# Patient Record
Sex: FEMALE | Race: OTHER | Employment: FULL TIME | URBAN - METROPOLITAN AREA
[De-identification: names, ages, dates, MRNs, and addresses within clinical notes are randomized per-mention and may not be internally consistent; named-entity substitution may affect disease eponyms.]

---

## 2017-09-14 ENCOUNTER — APPOINTMENT (OUTPATIENT)
Dept: CT IMAGING | Facility: HOSPITAL | Age: 57
End: 2017-09-14
Payer: COMMERCIAL

## 2017-09-14 ENCOUNTER — HOSPITAL ENCOUNTER (OUTPATIENT)
Age: 57
Discharge: ANOTHER HOSPITAL | End: 2017-09-14
Payer: COMMERCIAL

## 2017-09-14 ENCOUNTER — HOSPITAL ENCOUNTER (OUTPATIENT)
Facility: HOSPITAL | Age: 57
Setting detail: OBSERVATION
Discharge: HOME | End: 2017-09-15
Admitting: INTERNAL MEDICINE
Payer: COMMERCIAL

## 2017-09-14 VITALS
SYSTOLIC BLOOD PRESSURE: 140 MMHG | TEMPERATURE: 98 F | HEART RATE: 77 BPM | WEIGHT: 130 LBS | OXYGEN SATURATION: 97 % | RESPIRATION RATE: 18 BRPM | DIASTOLIC BLOOD PRESSURE: 81 MMHG | BODY MASS INDEX: 28.05 KG/M2 | HEIGHT: 57 IN

## 2017-09-14 DIAGNOSIS — R51.9 NONINTRACTABLE HEADACHE, UNSPECIFIED CHRONICITY PATTERN, UNSPECIFIED HEADACHE TYPE: ICD-10-CM

## 2017-09-14 DIAGNOSIS — R20.0 FACIAL NUMBNESS: Primary | ICD-10-CM

## 2017-09-14 DIAGNOSIS — R20.0 RIGHT FACIAL NUMBNESS: Primary | ICD-10-CM

## 2017-09-14 LAB
ANION GAP SERPL CALC-SCNC: 7 MMOL/L (ref 0–18)
BASOPHILS # BLD: 0 K/UL (ref 0–0.2)
BASOPHILS NFR BLD: 1 %
BUN SERPL-MCNC: 12 MG/DL (ref 8–20)
BUN/CREAT SERPL: 12.6 (ref 10–20)
CALCIUM SERPL-MCNC: 9.3 MG/DL (ref 8.5–10.5)
CHLORIDE SERPL-SCNC: 105 MMOL/L (ref 95–110)
CO2 SERPL-SCNC: 28 MMOL/L (ref 22–32)
CREAT SERPL-MCNC: 0.95 MG/DL (ref 0.5–1.5)
EOSINOPHIL # BLD: 0 K/UL (ref 0–0.7)
EOSINOPHIL NFR BLD: 1 %
ERYTHROCYTE [DISTWIDTH] IN BLOOD BY AUTOMATED COUNT: 12.5 % (ref 11–15)
GLUCOSE BLDC GLUCOMTR-MCNC: 117 MG/DL (ref 70–99)
GLUCOSE SERPL-MCNC: 121 MG/DL (ref 70–99)
HCT VFR BLD AUTO: 36.9 % (ref 35–48)
HGB BLD-MCNC: 12.3 G/DL (ref 12–16)
LYMPHOCYTES # BLD: 1.1 K/UL (ref 1–4)
LYMPHOCYTES NFR BLD: 27 %
MCH RBC QN AUTO: 28.3 PG (ref 27–32)
MCHC RBC AUTO-ENTMCNC: 33.4 G/DL (ref 32–37)
MCV RBC AUTO: 84.7 FL (ref 80–100)
MONOCYTES # BLD: 0.3 K/UL (ref 0–1)
MONOCYTES NFR BLD: 8 %
NEUTROPHILS # BLD AUTO: 2.7 K/UL (ref 1.8–7.7)
NEUTROPHILS NFR BLD: 64 %
OSMOLALITY UR CALC.SUM OF ELEC: 291 MOSM/KG (ref 275–295)
PLATELET # BLD AUTO: 269 K/UL (ref 140–400)
PMV BLD AUTO: 8.1 FL (ref 7.4–10.3)
POTASSIUM SERPL-SCNC: 3.8 MMOL/L (ref 3.3–5.1)
RBC # BLD AUTO: 4.36 M/UL (ref 3.7–5.4)
SODIUM SERPL-SCNC: 140 MMOL/L (ref 136–144)
TROPONIN I SERPL-MCNC: 0 NG/ML (ref ?–0.03)
WBC # BLD AUTO: 4.2 K/UL (ref 4–11)

## 2017-09-14 PROCEDURE — 99202 OFFICE O/P NEW SF 15 MIN: CPT

## 2017-09-14 PROCEDURE — 70450 CT HEAD/BRAIN W/O DYE: CPT

## 2017-09-14 PROCEDURE — 99213 OFFICE O/P EST LOW 20 MIN: CPT

## 2017-09-14 RX ORDER — POTASSIUM CHLORIDE 20 MEQ/1
40 TABLET, EXTENDED RELEASE ORAL ONCE
Status: COMPLETED | OUTPATIENT
Start: 2017-09-14 | End: 2017-09-14

## 2017-09-14 RX ORDER — LEVOTHYROXINE SODIUM 0.05 MG/1
50 TABLET ORAL NIGHTLY
Status: DISCONTINUED | OUTPATIENT
Start: 2017-09-15 | End: 2017-09-14

## 2017-09-14 RX ORDER — MORPHINE SULFATE 2 MG/ML
1 INJECTION, SOLUTION INTRAMUSCULAR; INTRAVENOUS EVERY 4 HOURS PRN
Status: DISCONTINUED | OUTPATIENT
Start: 2017-09-14 | End: 2017-09-15

## 2017-09-14 RX ORDER — ACETAMINOPHEN 500 MG
1000 TABLET ORAL ONCE
Status: COMPLETED | OUTPATIENT
Start: 2017-09-14 | End: 2017-09-14

## 2017-09-14 RX ORDER — LEVOTHYROXINE SODIUM 0.05 MG/1
50 TABLET ORAL NIGHTLY
Status: DISCONTINUED | OUTPATIENT
Start: 2017-09-14 | End: 2017-09-15

## 2017-09-14 RX ORDER — ASPIRIN 81 MG/1
324 TABLET, CHEWABLE ORAL ONCE
Status: COMPLETED | OUTPATIENT
Start: 2017-09-14 | End: 2017-09-14

## 2017-09-14 RX ORDER — ACETAMINOPHEN 325 MG/1
650 TABLET ORAL EVERY 8 HOURS PRN
Status: DISCONTINUED | OUTPATIENT
Start: 2017-09-14 | End: 2017-09-15

## 2017-09-14 NOTE — ED PROVIDER NOTES
Patient presents with:  Numbness Weakness (neurologic)      HPI:     Wes Tarango is a 56year old female presents with a chief complaint of a headache and numbness and tingling to the right side of the face that started last night at 8 PM.  The patie above.  Constitutional and Vital Signs Reviewed.     Physical Exam:     /81   Pulse 77   Temp 98.3 °F (36.8 °C) (Oral)   Resp 18   Ht 144.8 cm (4' 9\")   Wt 59 kg   SpO2 97%   BMI 28.13 kg/m²   GENERAL: well developed, well nourished, well hydrated, n

## 2017-09-14 NOTE — ED PROVIDER NOTES
Patient Seen in: [facility] AND [facility] Emergency Department    History   Patient presents with:  Numbness Weakness (neurologic)    Stated Complaint: numbness/tingling to face.      HPI    57-year-old female presents for evaluation of right-sided facial numbne well-developed and well-nourished. No distress. HENT:   Head: Normocephalic and atraumatic. Mouth/Throat: Oropharynx is clear and moist.   Eyes: Conjunctivae and EOM are normal. Pupils are equal, round, and reactive to light.    Neck: Normal range of mo malignancy, Bell's palsy, trigeminal neuralgia, TIA, stroke    Well-appearing patient, does have numbness to the right face on exam.  ER workup unremarkable. Discussed with and admitted to Dr. Allison Coleman for further evaluation and neuro consultation.   Discus

## 2017-09-14 NOTE — ED NOTES
Care assumed from triage Presents to hospital on referral from [facility]. Alert and interactive with several hour hx R temporal HA and R facial/ R lateral tongue tingling. NIH score 0.

## 2017-09-14 NOTE — ED INITIAL ASSESSMENT (HPI)
Sent from [facility] for CT scan. Pt reports some right sided headache, numbness and tingling to right side of face and tongue since 8 pm last night. No facial droop or slurred speech. No weakness or numbness to extremities.

## 2017-09-14 NOTE — ED NOTES
PATIENT DECLINING EMS TRANSPORTATION TO ER FOR FURTHER EVALUATION. REPORT CALLED TO [address] [place] [address] ED RN.

## 2017-09-14 NOTE — ED INITIAL ASSESSMENT (HPI)
REPORTS RIGHT SIDED FACE NUMBNESS/TINGLING YESTERDAY ALSO C/O TONGUE TINGLING. STATES SHE HAS A BASELINE TINGLING IN HER HANDS FROM HISTORY OF CARPAL TUNNEL.   PATIENT ALSO REPORTS RIGHT EYE PAIN AND FEELS AT IT IS \"DROOPY\"  HAND GRASPS EQUAL IN STRENGTH

## 2017-09-15 VITALS
HEIGHT: 57 IN | BODY MASS INDEX: 29.52 KG/M2 | OXYGEN SATURATION: 98 % | DIASTOLIC BLOOD PRESSURE: 64 MMHG | HEART RATE: 56 BPM | WEIGHT: 136.81 LBS | TEMPERATURE: 99 F | RESPIRATION RATE: 16 BRPM | SYSTOLIC BLOOD PRESSURE: 124 MMHG

## 2017-09-15 LAB
FOLATE SERPL-MCNC: 19.2 NG/ML
POTASSIUM SERPL-SCNC: 4.5 MMOL/L (ref 3.3–5.1)
TSH SERPL-ACNC: 2.57 UIU/ML (ref 0.45–5.33)
VIT B12 SERPL-MCNC: >1500 PG/ML (ref 181–914)

## 2017-09-15 PROCEDURE — 99243 OFF/OP CNSLTJ NEW/EST LOW 30: CPT | Performed by: OTHER

## 2017-09-15 NOTE — DISCHARGE SUMMARY
[facility] AND [facility]  Discharge Summary    Emely Alberts Patient Status:  Inpatient    1960 MRN [id number]   Location [facility] 3W/SW Attending Perlita Zhao MD   Hosp Day # 1 PCP Gabriel Monte MD     Date of Admission: 2017    Date fredi Nunez  9/15/2017  1:54 PM

## 2017-09-15 NOTE — CONSULTS
Neurology Inpatient Consult Note    Martha King : 1960   Referring Physician: Dr. Bladimir Luo  HPI:     Martha King is a 56year old female who is being seen in neurologic evaluation.     Patient presented to the emergency room last frequency  MUSCULOSKELETAL: Carpal tunnel syndrome  PSYCH: no depression, no anxiety  NEURO: see HPI    EXAM:     Vitals:  /64 (BP Location: Left arm)   Pulse 56   Temp 98.5 °F (36.9 °C)   Resp 16   Ht 57\"   Wt 136 lb 12.8 oz   SpO2 98%   BMI 29.60

## 2017-09-15 NOTE — PAYOR COMM NOTE
--------------  ADMISSION REVIEW     Payor: BHARGAVI Nicholas Franks #:  [id number]  Authorization Number: N/A    Admit date: 9/14/17  Admit time: 2026       Admitting Physician: Andrea Hatfield MD  Attending Physician:  Andrea Hatfield MD  Primary Care Physi Alcohol use: No              Allergies/Medications: Allergies: No Known Allergies    Prescriptions Prior to Admission:  Levothyroxine Sodium 50 MCG Oral Tab Take 1 tablet (50 mcg total) by mouth once daily.    Cyclobenzaprine HCl 5 MG Oral Tab Take 1 tabl Pulmonary/Chest:[OR.1] Effort normal[OR.2] and[OR.1] breath sounds normal[OR.2]. Abdominal:[OR.1] Soft[OR.2]. [OR.1] Bowel sounds are normal[OR.2]. Musculoskeletal:[OR.1] Normal range of motion[OR.2].    Neurological: She is[OR.1] alert[OR.2] and[OR.1] o MEDICATIONS ADMINISTERED IN LAST 1 DAY:  acetaminophen (TYLENOL) tab 650 mg     Date Action Dose Route User    9/15/2017 0935 Given 650 mg Oral Lan Murphy, RN      acetaminophen (TYLENOL EXTRA STRENGTH) tab 1,000 mg     Date Action D Past Surgical History:  No date:            Family History   Problem Relation Age of Onset   • Diabetes Father     • Heart Disorder Father     • Psychiatric Father     • Other [OTHER] Father     • Heart Disorder Mother     • Diabetes Mother     • Sensory: intact to light touch and pinprick throughout  Reflexes: DTRs 2+ in bilateral biceps, brachioradialis, patella  Coordination / gait: no finger-nose-finger dysmetria, gait testing deferred     IMAGING / STUDIES:  reviewed   Noncontrast head CT  CON

## 2017-09-15 NOTE — H&P
[facility]    History and Physical    Lenny Quigley Patient Status:  Inpatient    1960 MRN [id number]   Location [facility] 3W/SW Attending Sade Arellano MD   Hosp Day # 1 PCP Faheem Leung MD     Date:  9/15/2017  Date Genitourinary: Negative for dysuria. Musculoskeletal: Negative for back pain. Neurological: Positive for numbness and headaches. Negative for dizziness, facial asymmetry, speech difficulty and weakness.    Psychiatric/Behavioral: Negative for confusio Interpretation  -------------------------- Sinus Rhythm -Poor R-wave progression -nonspecific -consider old anterior infarct.  - Nonspecific T-abnormality.  ABNORMAL No previous ECGs available Electronically signed on 09/14/2017 at 17:19 by Cahrles Ramos MD

## 2017-09-16 NOTE — PLAN OF CARE
Patient/Family Goals    • Patient/Family Long Term Goal Adequate for Discharge    • Patient/Family Short Term Goal Adequate for Discharge        REVIEWED DISCHARGE INSTRUCTIONS, MEDICATION LIST AND FOLLOW UP CARE  STROKE EDUCATION PROVIDED

## 2017-09-29 ENCOUNTER — TELEPHONE (OUTPATIENT)
Dept: NEUROLOGY | Facility: CLINIC | Age: 57
End: 2017-09-29

## 2018-03-23 ENCOUNTER — HOSPITAL ENCOUNTER (OUTPATIENT)
Dept: CT IMAGING | Facility: HOSPITAL | Age: 58
Discharge: HOME | End: 2018-03-23
Attending: INTERNAL MEDICINE
Payer: COMMERCIAL

## 2018-03-23 DIAGNOSIS — R10.10 PAIN OF UPPER ABDOMEN: ICD-10-CM

## 2018-03-23 LAB — CREAT BLD-MCNC: 0.7 MG/DL (ref 0.5–1.5)

## 2018-03-23 PROCEDURE — 74177 CT ABD & PELVIS W/CONTRAST: CPT | Performed by: INTERNAL MEDICINE

## 2018-03-23 PROCEDURE — 74160 CT ABDOMEN W/CONTRAST: CPT | Performed by: INTERNAL MEDICINE

## 2018-03-23 PROCEDURE — 82565 ASSAY OF CREATININE: CPT

## 2018-05-10 PROBLEM — R14.0 ABDOMINAL BLOATING: Status: ACTIVE | Noted: 2018-05-10

## 2018-06-01 PROCEDURE — 88305 TISSUE EXAM BY PATHOLOGIST: CPT | Performed by: INTERNAL MEDICINE

## 2018-10-22 ENCOUNTER — OFFICE VISIT (OUTPATIENT)
Dept: PHYSICAL THERAPY | Age: 58
End: 2018-10-22
Attending: UROLOGY
Payer: COMMERCIAL

## 2018-10-22 DIAGNOSIS — N39.3 FEMALE STRESS INCONTINENCE: ICD-10-CM

## 2018-10-22 DIAGNOSIS — R32 URINARY INCONTINENCE: ICD-10-CM

## 2018-10-22 PROCEDURE — 97162 PT EVAL MOD COMPLEX 30 MIN: CPT

## 2018-10-22 PROCEDURE — 97112 NEUROMUSCULAR REEDUCATION: CPT

## 2018-10-22 PROCEDURE — 97535 SELF CARE MNGMENT TRAINING: CPT

## 2018-10-22 NOTE — PROGRESS NOTES
PELVIC FLOOR EVALUATION:   Referring Physician: Dr. Zach Downs  Diagnosis: Female stress incontinence (N39.3);  Urinary incontinence (R32)  Date of Onset: chronic (30+ years)    Date of Service: 10/22/2018     PATIENT SUMMARY   Adin Goldberg is a 66 year in layers 1,2,3 of pelvic floor. The patient would benefit from the skilled intervention of a physical therapist for the impairments and functional limitations noted above.     Rehab Potential: good  Limiting factors: none    Skilled intervention will inclu min  Urine Stream: normal  Amount: normal  Leaking occurs: coughing, sneezing, laughing, running, lifting, urge  Episodes of Leakage: multiple times per hour  Amount of leakage: gushes  Pad use: yes,level 4-6  Pad Change frequency: 3x day  Nocturia: 1x  Fl Control    Double Leg Squat Decreased Decreased    Single Leg Squat Decreased Decreased    Single Leg Balance 3 sec 5 sec        PELVIC EXAMINATION  Verbal consent given internal examination: YES    External Observation  Mons pubis: WNL  Labia majora:  WNL Patient was instructed in and issued a HEP:  1. Pelvic anatomy  2. Bladder normatives  3.  Pelvic floor contraction 2 sec holds    Charges: PT Eval x1, 1 self care, 1NM   Total Time: 90min     Thank you for your referral. Please co-sign or sign and return

## 2018-10-29 ENCOUNTER — OFFICE VISIT (OUTPATIENT)
Dept: PHYSICAL THERAPY | Age: 58
End: 2018-10-29
Attending: UROLOGY
Payer: COMMERCIAL

## 2018-10-29 PROCEDURE — 97112 NEUROMUSCULAR REEDUCATION: CPT

## 2018-10-29 NOTE — PROGRESS NOTES
Diagnosis: Female stress incontinence (N39.3)  Urinary incontinence (R32)  Authorized # of Visits:  10 per POC       [facility]  Next MD visit: none  Referring Physician: Dr. Shivani Alfredo: no        Precautions: standard  Medication C

## 2018-11-07 ENCOUNTER — OFFICE VISIT (OUTPATIENT)
Dept: PHYSICAL THERAPY | Age: 58
End: 2018-11-07
Attending: UROLOGY
Payer: COMMERCIAL

## 2018-11-07 PROCEDURE — 97112 NEUROMUSCULAR REEDUCATION: CPT

## 2018-11-07 NOTE — PROGRESS NOTES
Diagnosis: Female stress incontinence (N39.3)  Urinary incontinence (R32)  Authorized # of Visits:  10 per POC       [facility]  Next MD visit: none  Referring Physician: Dr. Gera Henriquez: no        Precautions: standard  Medication C

## 2018-11-14 ENCOUNTER — OFFICE VISIT (OUTPATIENT)
Dept: PHYSICAL THERAPY | Age: 58
End: 2018-11-14
Attending: UROLOGY
Payer: COMMERCIAL

## 2018-11-14 PROCEDURE — 97112 NEUROMUSCULAR REEDUCATION: CPT

## 2018-11-14 NOTE — PROGRESS NOTES
Diagnosis: Female stress incontinence (N39.3)  Urinary incontinence (R32)  Authorized # of Visits:  10 per POC       [facility]  Next MD visit: none  Referring Physician: Dr. Morgan Houser: no        Precautions: standard  Medication C work.  Discussed importance of exercises and the functional impact. Plan: Continue to increase PF strengthening and endurance exercises.    Therapist: Thony Jackson, PT, DPT  11/14/2018    Charges: Ann Ga

## 2018-11-28 ENCOUNTER — OFFICE VISIT (OUTPATIENT)
Dept: PHYSICAL THERAPY | Age: 58
End: 2018-11-28
Attending: UROLOGY
Payer: COMMERCIAL

## 2018-11-28 PROCEDURE — 97140 MANUAL THERAPY 1/> REGIONS: CPT

## 2018-11-28 PROCEDURE — 97112 NEUROMUSCULAR REEDUCATION: CPT

## 2018-11-28 PROCEDURE — 97110 THERAPEUTIC EXERCISES: CPT

## 2018-11-28 NOTE — PROGRESS NOTES
Diagnosis: Female stress incontinence (N39.3)  Urinary incontinence (R32)  Authorized # of Visits:  10 per POC       [facility]  Next MD visit: none  Referring Physician: Dr. Shyla Snyder: no        Precautions: standard  Medication C PROGRESS               3. Pt to improve FOTO score to less than 10% impaired IN PROGRESS    Assessment: Patient presents with same symptoms. Reassess strength internally and continued to be weak (2/5).   Also, increase tension in pelvic floor and very TTP i

## 2018-12-05 ENCOUNTER — OFFICE VISIT (OUTPATIENT)
Dept: PHYSICAL THERAPY | Age: 58
End: 2018-12-05
Attending: UROLOGY
Payer: COMMERCIAL

## 2018-12-05 PROCEDURE — 97112 NEUROMUSCULAR REEDUCATION: CPT

## 2018-12-05 PROCEDURE — 97110 THERAPEUTIC EXERCISES: CPT

## 2018-12-05 NOTE — PROGRESS NOTES
Diagnosis: Female stress incontinence (N39.3)  Urinary incontinence (R32)  Authorized # of Visits:  10 per POC       [facility]  Next MD visit: none  Referring Physician: Dr. Mary Ball: no        Precautions: standard  Medication C contraction 10x  -contact relax bulge on SB  - SB seated rotations to promote relaxation 10x each direction   Self-Care                Goals: Long Term Goals (Time Frame 10 visits) by 1/22/19               1. Pt to report reduction of incontinence with cou

## 2018-12-12 ENCOUNTER — OFFICE VISIT (OUTPATIENT)
Dept: PHYSICAL THERAPY | Age: 58
End: 2018-12-12
Attending: UROLOGY
Payer: COMMERCIAL

## 2018-12-12 PROCEDURE — 97110 THERAPEUTIC EXERCISES: CPT

## 2018-12-12 PROCEDURE — 97112 NEUROMUSCULAR REEDUCATION: CPT

## 2018-12-12 NOTE — PROGRESS NOTES
Diagnosis: Female stress incontinence (N39.3)  Urinary incontinence (R32)  Authorized # of Visits:  10 per POC       [facility]: [facility]  Next MD visit: none  Referring Physician: Dr. Shyla Snyder: no        Precautions: standard  Medication C to stand with PF contraction 10x  -contact relax bulge on SB  - SB seated rotations to promote relaxation 10x each direction -PF contraction 3 sec hold 10x  -add sqz 5 sec hold 20x  -diaphragmatic breathing 10x  -D.  Breathing with PF 10x  -sit to stand wit

## 2018-12-19 ENCOUNTER — OFFICE VISIT (OUTPATIENT)
Dept: PHYSICAL THERAPY | Age: 58
End: 2018-12-19
Attending: UROLOGY
Payer: COMMERCIAL

## 2018-12-19 PROCEDURE — 97140 MANUAL THERAPY 1/> REGIONS: CPT

## 2018-12-19 PROCEDURE — 97112 NEUROMUSCULAR REEDUCATION: CPT

## 2018-12-19 NOTE — PROGRESS NOTES
Diagnosis: Female stress incontinence (N39.3)  Urinary incontinence (R32)  Authorized # of Visits:  10 per POC       [facility]  Next MD visit: none  Referring Physician: Dr. Maldonado Corado: no        Precautions: standard  Medication C 10x  -sit to stand with PF contraction 10x  -cough with PF contraction 10x  -diaphragmatic breathing 20x -PF contraction 3 sec hold 10x  -add sqz 5 sec hold 20x  -diaphragmatic breathing 10x  -D.  Breathing with PF 10x  -sit to stand with PF contraction 10x

## 2018-12-26 ENCOUNTER — OFFICE VISIT (OUTPATIENT)
Dept: PHYSICAL THERAPY | Age: 58
End: 2018-12-26
Attending: UROLOGY
Payer: COMMERCIAL

## 2018-12-26 PROCEDURE — 97140 MANUAL THERAPY 1/> REGIONS: CPT

## 2018-12-26 PROCEDURE — 97110 THERAPEUTIC EXERCISES: CPT

## 2018-12-26 NOTE — PROGRESS NOTES
Patient Name: Curtis Garcia [place]: 4/7/1960, MRN: [id number]   Date:  12/26/2018  Referring Physician:  Dr. Seo [place]    Diagnosis: Female stress incontinence (N39.3)  Urinary incontinence (R32)    Progress Summary    Pt has attended 9, cancelled 0, and findings, precautions, and treatment options and has agreed to actively participate in planning and for this course of care. Thank you for your referral. Please co-sign or sign and return this letter via fax as soon as possible to [phone].  If you h 20x  -prone Hip ext 2 x 10     Manual    Internal check: strength 3/5  PF contraction and bulge 10x  -DTM to layers 1,2,3 with release    Internal:   PF contraction and bulge 10x  -DTM to layers 1,2,3 with release   Therapeutic activity           Neuro-Re- Pt to improve FOTO score to less than 10% impaired IN PROGRESS    Assessment: see PN  Plan: see PN  Therapist: Dwayne Sargent, PT, DPT  12/26/2018    Charges: Ricard Kocher

## 2019-01-02 ENCOUNTER — OFFICE VISIT (OUTPATIENT)
Dept: PHYSICAL THERAPY | Age: 59
End: 2019-01-02
Attending: UROLOGY
Payer: COMMERCIAL

## 2019-01-02 PROCEDURE — 97140 MANUAL THERAPY 1/> REGIONS: CPT

## 2019-01-02 NOTE — PROGRESS NOTES
Diagnosis: Female stress incontinence (N39.3)  Urinary incontinence (R32)  Authorized # of Visits:  10 per POC       Insurance Type: Yamil Gil [facility]  Next MD visit: 2/14/19  Referring Physician: Dr. Pantoja High: no        Precautions: standard  Medicatio sec hold 20x  -diaphragmatic breathing 10x  -D. Breathing with PF 10x  -sit to stand with PF contraction 10x  -cough with PF contraction 10x -PF contraction 2 sec hold 10x  -add sqz 5 sec hold 20x  -diaphragmatic breathing 10x  -D.  Breathing with PF 10x  -

## 2019-01-09 ENCOUNTER — OFFICE VISIT (OUTPATIENT)
Dept: PHYSICAL THERAPY | Age: 59
End: 2019-01-09
Attending: UROLOGY
Payer: COMMERCIAL

## 2019-01-09 PROCEDURE — 97112 NEUROMUSCULAR REEDUCATION: CPT

## 2019-01-09 PROCEDURE — 97110 THERAPEUTIC EXERCISES: CPT

## 2019-01-09 NOTE — PROGRESS NOTES
Diagnosis: Female stress incontinence (N39.3)  Urinary incontinence (R32)  Authorized # of Visits:  10 per POC       Insurance Type: [facility]  Next MD visit: 2/14/19  Referring Physician: Dr. Hawa Mitchell: no        Precautions: standard  Medicatio 10x 2 sec  -SB seated marches 10x- SB seated- SB seated rotations to promote relaxation 10x each direction rotations to promote relaxation 10x each direction -PF contraction 2 sec hold 10x  -add sqz 5 sec hold 20x  -diaphragmatic breathing 10x  -JESSICA Chance one occurrence of leakage each work day. Difficulty coordinating breathing with pelvic floor.     Plan: reassess symptoms, continue with relaxation of pelvic floor  Therapist: Brianna Lopez, PT, DPT  1/9/19   Charges: Mckenzie Lincoln

## 2019-01-16 ENCOUNTER — OFFICE VISIT (OUTPATIENT)
Dept: PHYSICAL THERAPY | Age: 59
End: 2019-01-16
Attending: UROLOGY
Payer: COMMERCIAL

## 2019-01-16 PROCEDURE — 97112 NEUROMUSCULAR REEDUCATION: CPT

## 2019-01-16 NOTE — PROGRESS NOTES
Diagnosis: Female stress incontinence (N39.3)  Urinary incontinence (R32)  Authorized # of Visits:  10 per POC       Insurance Type: [facility]  Next MD visit: 2/14/19  Referring Physician: Dr. Mary Ball: no        Precautions: standard  Medicatio layers 1,2,3 with release    Internal:   PF contraction and bulge 10x  -DTM to layers 1,2,3 with release Internal:   -DTM to layers 1,2,3 with release     Therapeutic activity              Neuro-Re-Education -PF contraction 2 sec hold 10x  -add sqz 5 sec h urge is felt at work.                         Goals: Long Term Goals (Time Frame 10 visits) by 1/22/19               1. Pt to report reduction of incontinence with coughing, sneezing, laughing to 1x month or less IN PROGRESS               2. Pt to Junie Suazo

## 2019-01-23 ENCOUNTER — APPOINTMENT (OUTPATIENT)
Dept: PHYSICAL THERAPY | Age: 59
End: 2019-01-23
Attending: UROLOGY
Payer: COMMERCIAL

## 2019-01-27 ENCOUNTER — APPOINTMENT (OUTPATIENT)
Dept: GENERAL RADIOLOGY | Facility: HOSPITAL | Age: 59
End: 2019-01-27
Attending: EMERGENCY MEDICINE
Payer: COMMERCIAL

## 2019-01-27 ENCOUNTER — HOSPITAL ENCOUNTER (EMERGENCY)
Facility: HOSPITAL | Age: 59
Discharge: HOME | End: 2019-01-27
Attending: EMERGENCY MEDICINE
Payer: COMMERCIAL

## 2019-01-27 VITALS
DIASTOLIC BLOOD PRESSURE: 75 MMHG | RESPIRATION RATE: 20 BRPM | TEMPERATURE: 98 F | SYSTOLIC BLOOD PRESSURE: 133 MMHG | OXYGEN SATURATION: 96 % | HEART RATE: 72 BPM

## 2019-01-27 DIAGNOSIS — S93.401A MODERATE RIGHT ANKLE SPRAIN, INITIAL ENCOUNTER: Primary | ICD-10-CM

## 2019-01-27 PROCEDURE — 99283 EMERGENCY DEPT VISIT LOW MDM: CPT

## 2019-01-27 PROCEDURE — 73610 X-RAY EXAM OF ANKLE: CPT | Performed by: EMERGENCY MEDICINE

## 2019-01-28 NOTE — ED PROVIDER NOTES
Patient Seen in: [facility] AND [facility] Emergency Department    History   Patient presents with: Ankle Injury    Stated Complaint: R Foot Pain    HPI    Patient presents the emergency department after injuring her right ankle.   She states that she twisted he malleolar swelling, mild ecchymosis and tenderness. The calf is nontender. The Achilles tendon is clinically intact. The knee is nontender. Nursing note and vitals reviewed.             ED Course   Labs Reviewed - No data to display             MDM

## 2019-01-28 NOTE — ED NOTES
Pt stated missing her last step as she was walking down the stairs. Pt stated twisting her right ankle. Pt denied falling. Denied numbness or tingling.

## 2019-01-30 ENCOUNTER — APPOINTMENT (OUTPATIENT)
Dept: PHYSICAL THERAPY | Age: 59
End: 2019-01-30
Attending: UROLOGY
Payer: COMMERCIAL

## 2019-05-18 ENCOUNTER — HOSPITAL ENCOUNTER (OUTPATIENT)
Dept: MAMMOGRAPHY | Facility: HOSPITAL | Age: 59
Discharge: HOME | End: 2019-05-18
Attending: INTERNAL MEDICINE
Payer: COMMERCIAL

## 2019-05-18 DIAGNOSIS — Z01.419 ROUTINE GYNECOLOGICAL EXAMINATION: ICD-10-CM

## 2019-05-18 PROCEDURE — 77063 BREAST TOMOSYNTHESIS BI: CPT | Performed by: INTERNAL MEDICINE

## 2019-05-18 PROCEDURE — 77067 SCR MAMMO BI INCL CAD: CPT | Performed by: INTERNAL MEDICINE

## 2019-09-20 ENCOUNTER — OFFICE VISIT (OUTPATIENT)
Dept: UROLOGY | Facility: HOSPITAL | Age: 59
End: 2019-09-20
Attending: OBSTETRICS & GYNECOLOGY
Payer: COMMERCIAL

## 2019-09-20 VITALS
DIASTOLIC BLOOD PRESSURE: 88 MMHG | BODY MASS INDEX: 29.12 KG/M2 | HEIGHT: 57 IN | WEIGHT: 135 LBS | SYSTOLIC BLOOD PRESSURE: 160 MMHG

## 2019-09-20 DIAGNOSIS — N39.3 FEMALE STRESS INCONTINENCE: Primary | ICD-10-CM

## 2019-09-20 DIAGNOSIS — R39.15 URINARY URGENCY: ICD-10-CM

## 2019-09-20 DIAGNOSIS — N95.2 POSTMENOPAUSAL ATROPHIC VAGINITIS: ICD-10-CM

## 2019-09-20 DIAGNOSIS — N81.84 PELVIC MUSCLE WASTING: ICD-10-CM

## 2019-09-20 DIAGNOSIS — N39.41 URGE INCONTINENCE: ICD-10-CM

## 2019-09-20 LAB
BLOOD URINE: NEGATIVE
CONTROL RUN WITHIN 24 HOURS?: YES
LEUKOCYTE ESTERASE URINE: NEGATIVE
NITRITE URINE: NEGATIVE

## 2019-09-20 PROCEDURE — 81002 URINALYSIS NONAUTO W/O SCOPE: CPT

## 2019-09-20 PROCEDURE — 87086 URINE CULTURE/COLONY COUNT: CPT | Performed by: OBSTETRICS & GYNECOLOGY

## 2019-09-20 PROCEDURE — 99201 HC OUTPT EVAL AND MGNT NEW PT LEVEL 1: CPT

## 2019-09-20 NOTE — PROGRESS NOTES
Fabian Betancur, DO  9/20/2019     Referred by Dr. Emma Cisneros  Pt here with self    Patient presents with:  Urge Incontinence      HPI:  +TIM  +UUI  Limits activity given incontinence  Some sense of incomplete bladder emptying  Denies prolapse sx  No dyspareuni Allergies:  No Known Allergies    Medications:    Outpatient Medications Prior to Visit:  Levothyroxine Sodium 50 MCG Oral Tab Take 1 tablet (50 mcg total) by mouth once daily.  Disp: 90 tablet Rfl: 1   famotidine 40 MG Oral Tab One daily hs Disp: 30 ta nontender  Perineum: nontender  Anus: wnl  Rectum: defer    PELVIS FLOOR NEUROMUSCULAR FUNCTION:  Strength:  1, Unable to hold greater than 3 sec, Increased tone, Unable to relax and Tender  Perineal Sensation:  Normal      PELVIC SUPPORT:  Friant:  0  Ant: [facility]  [phone]    Discussion undertaken in Syrian, info provided in Ukrainian

## 2019-09-24 ENCOUNTER — TELEPHONE (OUTPATIENT)
Dept: UROLOGY | Facility: HOSPITAL | Age: 59
End: 2019-09-24

## 2019-09-24 NOTE — TELEPHONE ENCOUNTER
Used  line for this encounter. Spoke with Anatoly Gonzalez at [phone] . Left a message for this patient. The urine culture that was obtained on 09/20/2019 had normal vaginal merrill. Encouraged patient to call with questions or concerns.

## 2019-10-03 ENCOUNTER — OFFICE VISIT (OUTPATIENT)
Dept: UROLOGY | Facility: HOSPITAL | Age: 59
End: 2019-10-03
Attending: OBSTETRICS & GYNECOLOGY
Payer: COMMERCIAL

## 2019-10-03 VITALS — SYSTOLIC BLOOD PRESSURE: 118 MMHG | DIASTOLIC BLOOD PRESSURE: 80 MMHG

## 2019-10-03 DIAGNOSIS — N39.3 FEMALE STRESS INCONTINENCE: Primary | ICD-10-CM

## 2019-10-03 PROCEDURE — 51797 INTRAABDOMINAL PRESSURE TEST: CPT

## 2019-10-03 PROCEDURE — 51741 ELECTRO-UROFLOWMETRY FIRST: CPT

## 2019-10-03 PROCEDURE — 51784 ANAL/URINARY MUSCLE STUDY: CPT

## 2019-10-03 PROCEDURE — 51729 CYSTOMETROGRAM W/VP&UP: CPT

## 2019-10-03 PROCEDURE — 81002 URINALYSIS NONAUTO W/O SCOPE: CPT

## 2019-10-03 NOTE — PATIENT INSTRUCTIONS
[address]: [phone]       Urodynamic Testing Discharge Instructions: There are NO dietary or activity restrictions. You may resume your normal schedule.       You

## 2019-10-03 NOTE — PROCEDURES
Patient here for urodynamic testing. Procedure explained and confirmed by patient. See evaluation form for results. Both verbal and written discharge instructions were given.   Patient tolerated procedure well and will follow up with Dr. Marlene Boykin mL  Pattern:  [x]  Normal  []  Poor flow     []  Intermittent  [x]  Other ( moderate urge to void)  Void:   [x]  Typical  []  Atypical    Additional Notes:    CYSTOMETRY:  Urethral Catheter:  Fr 7 / tdoc  Abd Catheter:     Fr 7 / tdoc   Infusion:  Water R

## 2019-11-08 ENCOUNTER — OFFICE VISIT (OUTPATIENT)
Dept: UROLOGY | Facility: HOSPITAL | Age: 59
End: 2019-11-08
Attending: OBSTETRICS & GYNECOLOGY
Payer: COMMERCIAL

## 2019-11-08 VITALS
DIASTOLIC BLOOD PRESSURE: 88 MMHG | SYSTOLIC BLOOD PRESSURE: 144 MMHG | HEIGHT: 57 IN | BODY MASS INDEX: 28.48 KG/M2 | WEIGHT: 132 LBS

## 2019-11-08 DIAGNOSIS — N39.3 FEMALE STRESS INCONTINENCE: Primary | ICD-10-CM

## 2019-11-08 DIAGNOSIS — N39.41 URGE INCONTINENCE: ICD-10-CM

## 2019-11-08 PROCEDURE — 99212 OFFICE O/P EST SF 10 MIN: CPT

## 2019-11-08 NOTE — PROGRESS NOTES
Pt presents w/ initial c/o UI  Urodynamic testing undergone without complication.   Results reviewed with patient  65/18cc & 390/20cc  No DO  alf 340 cc  +TIM @ 100cc    Discussed with patient mgmt options for TIM, UUI  TIM > UUI for pt    Discussed dietary

## 2019-12-20 ENCOUNTER — HOSPITAL ENCOUNTER (OUTPATIENT)
Dept: GENERAL RADIOLOGY | Age: 59
Discharge: HOME | End: 2019-12-20
Attending: INTERNAL MEDICINE
Payer: COMMERCIAL

## 2019-12-20 DIAGNOSIS — Z01.818 PREOP EXAMINATION: ICD-10-CM

## 2019-12-20 DIAGNOSIS — I10 ESSENTIAL HYPERTENSION, BENIGN: ICD-10-CM

## 2019-12-20 PROCEDURE — 71046 X-RAY EXAM CHEST 2 VIEWS: CPT | Performed by: INTERNAL MEDICINE

## 2019-12-30 ENCOUNTER — HOSPITAL ENCOUNTER (OUTPATIENT)
Age: 59
Discharge: HOME | End: 2019-12-30
Attending: EMERGENCY MEDICINE
Payer: OTHER MISCELLANEOUS

## 2019-12-30 ENCOUNTER — APPOINTMENT (OUTPATIENT)
Dept: GENERAL RADIOLOGY | Age: 59
End: 2019-12-30
Attending: EMERGENCY MEDICINE
Payer: OTHER MISCELLANEOUS

## 2019-12-30 VITALS
OXYGEN SATURATION: 100 % | BODY MASS INDEX: 28.48 KG/M2 | HEIGHT: 57 IN | SYSTOLIC BLOOD PRESSURE: 149 MMHG | HEART RATE: 65 BPM | RESPIRATION RATE: 22 BRPM | DIASTOLIC BLOOD PRESSURE: 67 MMHG | TEMPERATURE: 98 F | WEIGHT: 132 LBS

## 2019-12-30 DIAGNOSIS — M54.2 NECK PAIN: Primary | ICD-10-CM

## 2019-12-30 PROCEDURE — 72072 X-RAY EXAM THORAC SPINE 3VWS: CPT | Performed by: EMERGENCY MEDICINE

## 2019-12-30 PROCEDURE — 99214 OFFICE O/P EST MOD 30 MIN: CPT

## 2019-12-30 PROCEDURE — 72040 X-RAY EXAM NECK SPINE 2-3 VW: CPT | Performed by: EMERGENCY MEDICINE

## 2019-12-30 NOTE — ED INITIAL ASSESSMENT (HPI)
PATIENT ARRIVED AMBULATORY TO ROOM. PATIENT WAS INJURED WHILE AT WORK. PATIENT STATES SHE WAS CLEANING WHEN A BOX FELL ON HER BACK. NO LOC. PATIENT C/O MID/UPPER BACK PAIN, POSTERIOR NECK PAIN.

## 2020-01-02 ENCOUNTER — APPOINTMENT (OUTPATIENT)
Dept: OTHER | Facility: HOSPITAL | Age: 60
End: 2020-01-02
Attending: ORTHOPAEDIC SURGERY
Payer: COMMERCIAL

## 2020-01-09 ENCOUNTER — APPOINTMENT (OUTPATIENT)
Dept: OTHER | Facility: HOSPITAL | Age: 60
End: 2020-01-09
Attending: EMERGENCY MEDICINE

## 2020-01-16 ENCOUNTER — HOSPITAL ENCOUNTER (OUTPATIENT)
Facility: HOSPITAL | Age: 60
Setting detail: HOSPITAL OUTPATIENT SURGERY
Discharge: HOME | End: 2020-01-16
Attending: OBSTETRICS & GYNECOLOGY | Admitting: OBSTETRICS & GYNECOLOGY
Payer: COMMERCIAL

## 2020-01-16 ENCOUNTER — ANESTHESIA EVENT (OUTPATIENT)
Dept: SURGERY | Facility: HOSPITAL | Age: 60
End: 2020-01-16
Payer: COMMERCIAL

## 2020-01-16 ENCOUNTER — ANESTHESIA (OUTPATIENT)
Dept: SURGERY | Facility: HOSPITAL | Age: 60
End: 2020-01-16
Payer: COMMERCIAL

## 2020-01-16 VITALS
DIASTOLIC BLOOD PRESSURE: 74 MMHG | HEIGHT: 57 IN | TEMPERATURE: 98 F | OXYGEN SATURATION: 100 % | WEIGHT: 131 LBS | HEART RATE: 57 BPM | BODY MASS INDEX: 28.26 KG/M2 | RESPIRATION RATE: 14 BRPM | SYSTOLIC BLOOD PRESSURE: 136 MMHG

## 2020-01-16 DIAGNOSIS — N39.3 SUI (STRESS URINARY INCONTINENCE, FEMALE): Primary | ICD-10-CM

## 2020-01-16 PROCEDURE — 0TSC0ZZ REPOSITION BLADDER NECK, OPEN APPROACH: ICD-10-PCS | Performed by: OBSTETRICS & GYNECOLOGY

## 2020-01-16 DEVICE — TRANSVAGINAL MID-URETHRAL SLING
Type: IMPLANTABLE DEVICE | Site: BLADDER | Status: FUNCTIONAL
Brand: ADVANTAGE FIT™ BLUE SYSTEM

## 2020-01-16 RX ORDER — CEFAZOLIN SODIUM/WATER 2 G/20 ML
2 SYRINGE (ML) INTRAVENOUS ONCE
Status: COMPLETED | OUTPATIENT
Start: 2020-01-16 | End: 2020-01-16

## 2020-01-16 RX ORDER — MORPHINE SULFATE 4 MG/ML
2 INJECTION, SOLUTION INTRAMUSCULAR; INTRAVENOUS EVERY 10 MIN PRN
Status: DISCONTINUED | OUTPATIENT
Start: 2020-01-16 | End: 2020-01-16

## 2020-01-16 RX ORDER — MIDAZOLAM HYDROCHLORIDE 1 MG/ML
INJECTION INTRAMUSCULAR; INTRAVENOUS AS NEEDED
Status: DISCONTINUED | OUTPATIENT
Start: 2020-01-16 | End: 2020-01-16 | Stop reason: SURG

## 2020-01-16 RX ORDER — KETOROLAC TROMETHAMINE 30 MG/ML
INJECTION, SOLUTION INTRAMUSCULAR; INTRAVENOUS AS NEEDED
Status: DISCONTINUED | OUTPATIENT
Start: 2020-01-16 | End: 2020-01-16 | Stop reason: SURG

## 2020-01-16 RX ORDER — PROCHLORPERAZINE EDISYLATE 5 MG/ML
5 INJECTION INTRAMUSCULAR; INTRAVENOUS ONCE AS NEEDED
Status: DISCONTINUED | OUTPATIENT
Start: 2020-01-16 | End: 2020-01-16

## 2020-01-16 RX ORDER — METOCLOPRAMIDE 10 MG/1
10 TABLET ORAL ONCE
Status: DISCONTINUED | OUTPATIENT
Start: 2020-01-16 | End: 2020-01-16 | Stop reason: HOSPADM

## 2020-01-16 RX ORDER — HYDROCODONE BITARTRATE AND ACETAMINOPHEN 5; 325 MG/1; MG/1
1 TABLET ORAL EVERY 4 HOURS PRN
Qty: 8 TABLET | Refills: 0 | Status: SHIPPED | OUTPATIENT
Start: 2020-01-16 | End: 2020-01-20 | Stop reason: ALTCHOICE

## 2020-01-16 RX ORDER — HYDROMORPHONE HYDROCHLORIDE 1 MG/ML
0.2 INJECTION, SOLUTION INTRAMUSCULAR; INTRAVENOUS; SUBCUTANEOUS EVERY 5 MIN PRN
Status: DISCONTINUED | OUTPATIENT
Start: 2020-01-16 | End: 2020-01-16

## 2020-01-16 RX ORDER — ONDANSETRON 2 MG/ML
4 INJECTION INTRAMUSCULAR; INTRAVENOUS ONCE AS NEEDED
Status: DISCONTINUED | OUTPATIENT
Start: 2020-01-16 | End: 2020-01-16

## 2020-01-16 RX ORDER — DEXAMETHASONE SODIUM PHOSPHATE 4 MG/ML
VIAL (ML) INJECTION AS NEEDED
Status: DISCONTINUED | OUTPATIENT
Start: 2020-01-16 | End: 2020-01-16 | Stop reason: SURG

## 2020-01-16 RX ORDER — MORPHINE SULFATE 4 MG/ML
4 INJECTION, SOLUTION INTRAMUSCULAR; INTRAVENOUS EVERY 10 MIN PRN
Status: DISCONTINUED | OUTPATIENT
Start: 2020-01-16 | End: 2020-01-16

## 2020-01-16 RX ORDER — HYDROCODONE BITARTRATE AND ACETAMINOPHEN 5; 325 MG/1; MG/1
1 TABLET ORAL AS NEEDED
Status: COMPLETED | OUTPATIENT
Start: 2020-01-16 | End: 2020-01-16

## 2020-01-16 RX ORDER — MORPHINE SULFATE 10 MG/ML
6 INJECTION, SOLUTION INTRAMUSCULAR; INTRAVENOUS EVERY 10 MIN PRN
Status: DISCONTINUED | OUTPATIENT
Start: 2020-01-16 | End: 2020-01-16

## 2020-01-16 RX ORDER — ACETAMINOPHEN 500 MG
1000 TABLET ORAL ONCE
Status: COMPLETED | OUTPATIENT
Start: 2020-01-16 | End: 2020-01-16

## 2020-01-16 RX ORDER — HALOPERIDOL 5 MG/ML
0.25 INJECTION INTRAMUSCULAR ONCE AS NEEDED
Status: DISCONTINUED | OUTPATIENT
Start: 2020-01-16 | End: 2020-01-16

## 2020-01-16 RX ORDER — ONDANSETRON 2 MG/ML
INJECTION INTRAMUSCULAR; INTRAVENOUS AS NEEDED
Status: DISCONTINUED | OUTPATIENT
Start: 2020-01-16 | End: 2020-01-16 | Stop reason: SURG

## 2020-01-16 RX ORDER — SODIUM CHLORIDE, SODIUM LACTATE, POTASSIUM CHLORIDE, CALCIUM CHLORIDE 600; 310; 30; 20 MG/100ML; MG/100ML; MG/100ML; MG/100ML
INJECTION, SOLUTION INTRAVENOUS CONTINUOUS
Status: DISCONTINUED | OUTPATIENT
Start: 2020-01-16 | End: 2020-01-16

## 2020-01-16 RX ORDER — HYDROMORPHONE HYDROCHLORIDE 1 MG/ML
0.4 INJECTION, SOLUTION INTRAMUSCULAR; INTRAVENOUS; SUBCUTANEOUS EVERY 5 MIN PRN
Status: DISCONTINUED | OUTPATIENT
Start: 2020-01-16 | End: 2020-01-16

## 2020-01-16 RX ORDER — HYDROMORPHONE HYDROCHLORIDE 1 MG/ML
0.6 INJECTION, SOLUTION INTRAMUSCULAR; INTRAVENOUS; SUBCUTANEOUS EVERY 5 MIN PRN
Status: DISCONTINUED | OUTPATIENT
Start: 2020-01-16 | End: 2020-01-16

## 2020-01-16 RX ORDER — HYDROCODONE BITARTRATE AND ACETAMINOPHEN 5; 325 MG/1; MG/1
2 TABLET ORAL AS NEEDED
Status: COMPLETED | OUTPATIENT
Start: 2020-01-16 | End: 2020-01-16

## 2020-01-16 RX ORDER — LIDOCAINE HYDROCHLORIDE 10 MG/ML
INJECTION, SOLUTION EPIDURAL; INFILTRATION; INTRACAUDAL; PERINEURAL AS NEEDED
Status: DISCONTINUED | OUTPATIENT
Start: 2020-01-16 | End: 2020-01-16 | Stop reason: SURG

## 2020-01-16 RX ORDER — IBUPROFEN 600 MG/1
600 TABLET ORAL EVERY 6 HOURS PRN
Status: DISCONTINUED | OUTPATIENT
Start: 2020-01-16 | End: 2020-01-16

## 2020-01-16 RX ORDER — FAMOTIDINE 20 MG/1
20 TABLET ORAL ONCE
Status: DISCONTINUED | OUTPATIENT
Start: 2020-01-16 | End: 2020-01-16 | Stop reason: HOSPADM

## 2020-01-16 RX ORDER — NALOXONE HYDROCHLORIDE 0.4 MG/ML
80 INJECTION, SOLUTION INTRAMUSCULAR; INTRAVENOUS; SUBCUTANEOUS AS NEEDED
Status: DISCONTINUED | OUTPATIENT
Start: 2020-01-16 | End: 2020-01-16

## 2020-01-16 RX ADMIN — DEXAMETHASONE SODIUM PHOSPHATE 8 MG: 4 MG/ML VIAL (ML) INJECTION at 11:12:00

## 2020-01-16 RX ADMIN — MIDAZOLAM HYDROCHLORIDE 2 MG: 1 INJECTION INTRAMUSCULAR; INTRAVENOUS at 11:05:00

## 2020-01-16 RX ADMIN — ONDANSETRON 4 MG: 2 INJECTION INTRAMUSCULAR; INTRAVENOUS at 11:12:00

## 2020-01-16 RX ADMIN — LIDOCAINE HYDROCHLORIDE 50 MG: 10 INJECTION, SOLUTION EPIDURAL; INFILTRATION; INTRACAUDAL; PERINEURAL at 11:12:00

## 2020-01-16 RX ADMIN — CEFAZOLIN SODIUM/WATER 2 G: 2 G/20 ML SYRINGE (ML) INTRAVENOUS at 11:17:00

## 2020-01-16 RX ADMIN — SODIUM CHLORIDE, SODIUM LACTATE, POTASSIUM CHLORIDE, CALCIUM CHLORIDE: 600; 310; 30; 20 INJECTION, SOLUTION INTRAVENOUS at 11:52:00

## 2020-01-16 RX ADMIN — KETOROLAC TROMETHAMINE 30 MG: 30 INJECTION, SOLUTION INTRAMUSCULAR; INTRAVENOUS at 11:35:00

## 2020-01-16 NOTE — ANESTHESIA PREPROCEDURE EVALUATION
Anesthesia PreOp Note    HPI:     Rhonda Tam is a 64year old female who presents for preoperative consultation requested by: Bolivar Ron DO    Date of Surgery: 1/16/2020    Procedure(s):  PUBO VAGINAL SLING  CYSTOSCOPY  Indication: stress inc Release, TAKE ONE CAPSULE BY MOUTH, Disp: 30 capsule, Rfl: 3, 1/15/2020 at 0800  famoTIDine 20 MG Oral Tab, , Disp: , Rfl: , 1/15/2020 at 0800      lactated ringers infusion, , Intravenous, Continuous, Bina Rivera, DO  famoTIDine (PEPCID) tab 20 mg, 2 tobacco: Never Used    Substance and Sexual Activity      Alcohol use: No        Alcohol/week: 0.0 standard drinks      Drug use: No      Sexual activity: Never    Lifestyle      Physical activity:        Days per week: Not on file        Minutes per sessi (132 lb) 59.4 kg (131 lb)   Height: 1.448 m (4' 9\") 1.448 m (4' 9\")        Anesthesia Evaluation     Patient summary reviewed and Nursing notes reviewed    Airway   Mallampati: II  TM distance: >3 FB  Neck ROM: full  Dental - normal exam     Pulmonary -

## 2020-01-16 NOTE — OPERATIVE REPORT
Pre-Operative Diagnosis: Stress Urinary Incontinence    Post-Operative Diagnosis: Same    Procedure Performed: midurethral sling (Advantage Fit); cystoscopy    Surgeon: Braden Olmstead DO    Findings: Bilateral ureteral efflux, no evidence of bladder, ure from the operating room to the recovery room in stable condition, having tolerated the procedure well. Sponge, lap, & needle counts were correct.

## 2020-01-16 NOTE — PROGRESS NOTES
notified of void amount:550ml and Post Void Residual-22ml. Orders rcvd to notify pt to drink to thirst only and to void Q2 hours while awake.   Pt and family notified in writing and verbally

## 2020-01-16 NOTE — ANESTHESIA POSTPROCEDURE EVALUATION
Patient: Nunu Guy    Procedure Summary     Date:  01/16/20 Room / Location:  [facility] OR  / [facility] OR    Anesthesia Start:  1107 Anesthesia Stop:  1921    Procedures:       PUBO VAGINAL SLING (N/A Bladder)      CYSTOSCOPY (N/A Bladder) Diagnos

## 2020-01-16 NOTE — ANESTHESIA PROCEDURE NOTES
Airway  Date/Time: 1/16/2020 11:14 AM  Urgency: Elective    Airway not difficult    General Information and Staff    Patient location during procedure: OR  Anesthesiologist: Meredith Catalan MD  Resident/CRNA: Keisha Veliz CRNA  Performed: CRNA     In

## 2020-01-17 ENCOUNTER — TELEPHONE (OUTPATIENT)
Dept: UROLOGY | Facility: HOSPITAL | Age: 60
End: 2020-01-17

## 2020-01-17 NOTE — TELEPHONE ENCOUNTER
TC to pt following surgery  Doing well, no complaints  voids freely, waiting for BM  Pain controlled with PO meds (IBP & norco)  Reviewed bowel mgmt and activity restrictions  Tolerates ambulation  No heavy vaginal bleeding, some spotting  No CP or SOB  Al

## 2020-01-27 ENCOUNTER — APPOINTMENT (OUTPATIENT)
Dept: OTHER | Facility: HOSPITAL | Age: 60
End: 2020-01-27
Attending: EMERGENCY MEDICINE

## 2020-01-29 ENCOUNTER — TELEPHONE (OUTPATIENT)
Dept: NEUROLOGY | Facility: CLINIC | Age: 60
End: 2020-01-29

## 2020-01-29 ENCOUNTER — OFFICE VISIT (OUTPATIENT)
Dept: NEUROLOGY | Facility: CLINIC | Age: 60
End: 2020-01-29
Payer: OTHER MISCELLANEOUS

## 2020-01-29 VITALS
BODY MASS INDEX: 28.48 KG/M2 | HEART RATE: 68 BPM | WEIGHT: 132 LBS | DIASTOLIC BLOOD PRESSURE: 80 MMHG | RESPIRATION RATE: 20 BRPM | HEIGHT: 57 IN | SYSTOLIC BLOOD PRESSURE: 136 MMHG

## 2020-01-29 DIAGNOSIS — S13.9XXA NECK SPRAIN, INITIAL ENCOUNTER: Primary | ICD-10-CM

## 2020-01-29 DIAGNOSIS — R12 HEARTBURN: ICD-10-CM

## 2020-01-29 DIAGNOSIS — F32.A DEPRESSION, UNSPECIFIED DEPRESSION TYPE: ICD-10-CM

## 2020-01-29 DIAGNOSIS — F41.9 ANXIETY: ICD-10-CM

## 2020-01-29 DIAGNOSIS — M79.18 MYOFASCIAL PAIN: ICD-10-CM

## 2020-01-29 PROCEDURE — 99245 OFF/OP CONSLTJ NEW/EST HI 55: CPT | Performed by: PHYSICAL MEDICINE & REHABILITATION

## 2020-01-29 RX ORDER — CYCLOBENZAPRINE HCL 10 MG
10 TABLET ORAL NIGHTLY
Qty: 30 TABLET | Refills: 0 | Status: SHIPPED | OUTPATIENT
Start: 2020-01-29 | End: 2020-02-28

## 2020-01-29 NOTE — TELEPHONE ENCOUNTER
Called Patient to request updated W/C information. [address] Phone # [phone]  Will call  for authorization of approval for Physical therapy. Called Daniel Cannon  to request authorization for Physical therapy. .Left message

## 2020-01-29 NOTE — TELEPHONE ENCOUNTER
Call transferred from  staff. Received call from , Heidi Muñoz from [address] [facility] to updated information.    [address] [facility]   Tel: [phone]  Fax: [phone]  Patient claim number # [id number]

## 2020-01-29 NOTE — PROGRESS NOTES
[address]  Progress Note    CHIEF COMPLAINT:  Patient presents with:  Neck Pain: Patient presents for work injury 12/27/2019, had a bag of toilet paper fall on her neck , shoulders and back of head.  Had ryliea normal   • CYSTOSCOPY N/A 1/16/2020    Performed by Jayshree Mendoza DO at [address]   • EGD  06/2018   • ESOPHAGOGASTRODUODENOSCOPY, POSSIBLE BIOPSY, POSSIBLE POLYPECTOMY 45158 N/A 6/1/2018    Performed by Roxy Weathers MD at [address] [place] [address] Produc*    RASH    REVIEW OF SYSTEMS:   Patient-reported ROS  Constitutional  Sleep Disturbance: admits  Chills: denies  Fever: denies  Weight Gain: denies  Weight Loss: denies   Cardiovascular  Chest Pain: denies  Irregular Heartbeat: denies   Respiratory intact, spontaneous speech intact  Strength: Upper extremities have 5/5 strength  Sensation: Normal upper extremities  Reflexes: Normal upper extremities  Spurling's sign: neg    Data    Radiology Imagin.   I personally reviewed plain film x-ray of the

## 2020-01-30 ENCOUNTER — MED REC SCAN ONLY (OUTPATIENT)
Dept: NEUROLOGY | Facility: CLINIC | Age: 60
End: 2020-01-30

## 2020-02-04 PROBLEM — R12 HEARTBURN: Status: ACTIVE | Noted: 2020-02-04

## 2020-02-04 PROBLEM — M79.18 MYOFASCIAL PAIN: Status: ACTIVE | Noted: 2020-02-04

## 2020-02-05 NOTE — TELEPHONE ENCOUNTER
Clinical notes complete. Faxed clinical notes to , Heidi Muñoz from [address] [facility] for authorization of physical therapy.    claim number # [id number]

## 2020-02-06 ENCOUNTER — MED REC SCAN ONLY (OUTPATIENT)
Dept: NEUROLOGY | Facility: CLINIC | Age: 60
End: 2020-02-06

## 2020-02-06 NOTE — TELEPHONE ENCOUNTER
Physical Therapy-APPROVED  Received fax from [address]  advising approval for Physical Therapy- APPROVED for   claim # [id number]. Sending authorization to scanning. Will call patient to inform.   Called patient to i

## 2020-02-14 ENCOUNTER — OFFICE VISIT (OUTPATIENT)
Dept: UROLOGY | Facility: HOSPITAL | Age: 60
End: 2020-02-14
Attending: OBSTETRICS & GYNECOLOGY
Payer: COMMERCIAL

## 2020-02-14 VITALS
DIASTOLIC BLOOD PRESSURE: 78 MMHG | WEIGHT: 132 LBS | SYSTOLIC BLOOD PRESSURE: 128 MMHG | TEMPERATURE: 98 F | HEIGHT: 57 IN | BODY MASS INDEX: 28.48 KG/M2

## 2020-02-14 DIAGNOSIS — Z98.890 POST-OPERATIVE STATE: Primary | ICD-10-CM

## 2020-02-14 PROCEDURE — 99212 OFFICE O/P EST SF 10 MIN: CPT

## 2020-02-14 NOTE — PROGRESS NOTES
She is s/p Post-Op Summary  Procedure Date: 01/16/20  Procedure Name: Cystoscopy;Prolene Mesh Mid Urethral Sling  Do you feel your surgery was successful?: Very successful  Compared to before surgery are you?: Much better  If you could go back to before yo

## 2020-03-04 ENCOUNTER — MED REC SCAN ONLY (OUTPATIENT)
Dept: NEUROLOGY | Facility: CLINIC | Age: 60
End: 2020-03-04

## 2020-04-10 ENCOUNTER — TELEPHONE (OUTPATIENT)
Dept: UROLOGY | Facility: HOSPITAL | Age: 60
End: 2020-04-10

## 2020-04-10 NOTE — TELEPHONE ENCOUNTER
TC to pt  Given circumstances surrounding COVID-19 recommend the up coming visit be conducted as a televisit with pt's consent.   Pt agrees  Billy Santiago

## 2020-04-17 ENCOUNTER — VIRTUAL PHONE E/M (OUTPATIENT)
Dept: UROLOGY | Facility: HOSPITAL | Age: 60
End: 2020-04-17
Attending: OBSTETRICS & GYNECOLOGY
Payer: COMMERCIAL

## 2020-04-17 VITALS — HEIGHT: 57 IN | WEIGHT: 132 LBS | BODY MASS INDEX: 28.48 KG/M2

## 2020-04-17 DIAGNOSIS — N81.84 PELVIC MUSCLE WASTING: ICD-10-CM

## 2020-04-17 DIAGNOSIS — N95.2 POSTMENOPAUSAL ATROPHIC VAGINITIS: ICD-10-CM

## 2020-04-17 DIAGNOSIS — Z98.890 POST-OPERATIVE STATE: ICD-10-CM

## 2020-04-17 DIAGNOSIS — N39.41 URGE INCONTINENCE: Primary | ICD-10-CM

## 2020-04-17 RX ORDER — ESTRADIOL 0.1 MG/G
CREAM VAGINAL
Qty: 1 TUBE | Refills: 3 | Status: SHIPPED | OUTPATIENT
Start: 2020-04-17 | End: 2021-12-13

## 2020-04-17 RX ORDER — OXYBUTYNIN CHLORIDE 5 MG/1
5 TABLET, EXTENDED RELEASE ORAL DAILY
Qty: 90 TABLET | Refills: 3 | Status: SHIPPED | OUTPATIENT
Start: 2020-04-17 | End: 2020-07-30 | Stop reason: SINTOL

## 2020-04-17 NOTE — PATIENT INSTRUCTIONS
Start vag estrogen twice weekly  Start oxybutynin ER 5mg daily  Call with an update in one month    [address] [address] [place] [address]    Fingertip Application Method for Estrogen Vaginal Cream    1.   Wash you hands with soap and wa

## 2020-04-17 NOTE — PROGRESS NOTES
She is s/p Post-Op Summary  Procedure Date: 01/16/20  Procedure Name: Prolene Mesh Mid Urethral Sling;Cystoscopy  Post-Op Symptoms: TIM;UUI  Do you feel your surgery was successful?: Very successful  Compared to before surgery are you?: Much better  If you

## 2020-04-20 ENCOUNTER — TELEPHONE (OUTPATIENT)
Dept: UROLOGY | Facility: HOSPITAL | Age: 60
End: 2020-04-20

## 2020-04-20 NOTE — TELEPHONE ENCOUNTER
Sent fax order for Estradiol to [facility] today. [facility] sent fax that Estradiol is too expensive.

## 2020-04-22 ENCOUNTER — TELEPHONE (OUTPATIENT)
Dept: UROLOGY | Facility: HOSPITAL | Age: 60
End: 2020-04-22

## 2020-04-22 NOTE — TELEPHONE ENCOUNTER
Called patient to inform her we received a letter from the pharmacy stating the Estrace Cream was too expensive. Informed patient about the [employer]and the cost for the Estrace Cream through them and how it works. Pt chooses to use the [employer]. [employer]   Order faxed a

## 2020-09-16 PROBLEM — K21.00 GASTROESOPHAGEAL REFLUX DISEASE WITH ESOPHAGITIS: Status: ACTIVE | Noted: 2020-09-16

## 2021-06-03 DIAGNOSIS — N39.41 URGE INCONTINENCE: ICD-10-CM

## 2021-06-03 RX ORDER — OXYBUTYNIN CHLORIDE 5 MG/1
TABLET, EXTENDED RELEASE ORAL
Qty: 90 TABLET | Refills: 3 | OUTPATIENT
Start: 2021-06-03

## 2021-12-13 ENCOUNTER — HOSPITAL ENCOUNTER (OUTPATIENT)
Dept: GENERAL RADIOLOGY | Age: 61
Discharge: HOME | End: 2021-12-13
Attending: INTERNAL MEDICINE
Payer: COMMERCIAL

## 2021-12-13 DIAGNOSIS — Z01.818 PREOP EXAMINATION: ICD-10-CM

## 2021-12-13 PROCEDURE — 71046 X-RAY EXAM CHEST 2 VIEWS: CPT | Performed by: INTERNAL MEDICINE

## 2022-01-16 PROBLEM — Z48.89 AFTERCARE FOLLOWING SURGERY: Status: ACTIVE | Noted: 2022-01-16

## 2022-08-07 ENCOUNTER — LAB ENCOUNTER (OUTPATIENT)
Dept: LAB | Facility: HOSPITAL | Age: 62
End: 2022-08-07
Attending: STUDENT IN AN ORGANIZED HEALTH CARE EDUCATION/TRAINING PROGRAM
Payer: COMMERCIAL

## 2022-08-07 DIAGNOSIS — Z01.818 PRE-OP TESTING: ICD-10-CM

## 2022-08-08 ENCOUNTER — LAB ENCOUNTER (OUTPATIENT)
Dept: LAB | Facility: HOSPITAL | Age: 62
End: 2022-08-08
Attending: STUDENT IN AN ORGANIZED HEALTH CARE EDUCATION/TRAINING PROGRAM
Payer: COMMERCIAL

## 2022-08-08 DIAGNOSIS — Z01.818 PRE-OP TESTING: ICD-10-CM

## 2022-08-08 LAB — SARS-COV-2 RNA RESP QL NAA+PROBE: NOT DETECTED

## 2022-08-09 LAB — SARS-COV-2 RNA RESP QL NAA+PROBE: NOT DETECTED

## 2022-08-11 PROBLEM — Z12.11 SPECIAL SCREENING FOR MALIGNANT NEOPLASM OF COLON: Status: ACTIVE | Noted: 2022-08-11

## 2022-08-11 PROBLEM — R13.10 DYSPHAGIA: Status: ACTIVE | Noted: 2022-08-11

## 2022-11-10 ENCOUNTER — HOSPITAL ENCOUNTER (OUTPATIENT)
Dept: CT IMAGING | Age: 62
Discharge: HOME | End: 2022-11-10
Attending: INTERNAL MEDICINE

## 2022-11-10 DIAGNOSIS — E78.00 PURE HYPERCHOLESTEROLEMIA: ICD-10-CM

## (undated) DEVICE — STANDARD HYPODERMIC NEEDLE,POLYPROPYLENE HUB: Brand: MONOJECT

## (undated) DEVICE — INTENDED FOR TISSUE SEPARATION, AND OTHER PROCEDURES THAT REQUIRE A SHARP SURGICAL BLADE TO PUNCTURE OR CUT.: Brand: BARD-PARKER ® STAINLESS STEEL BLADES

## (undated) DEVICE — MINOR GENERAL: Brand: MEDLINE INDUSTRIES, INC.

## (undated) DEVICE — Device: Brand: JELCO

## (undated) DEVICE — SOL H2O 3000ML IRRIG

## (undated) DEVICE — PADS MATERNITY W/WINGS 10.5X4

## (undated) DEVICE — SOL H2O IV

## (undated) DEVICE — VIOLET BRAIDED (POLYGLACTIN 910), SYNTHETIC ABSORBABLE SUTURE: Brand: COATED VICRYL

## (undated) DEVICE — TUBING CYSTO TUR DUAL

## (undated) DEVICE — UROLOGY DRAIN BAG

## (undated) DEVICE — SOL  .9 1000ML BTL

## (undated) DEVICE — CYSTO PACK: Brand: MEDLINE INDUSTRIES, INC.

## (undated) DEVICE — GAMMEX® PI HYBRID SIZE 6, STERILE POWDER-FREE SURGICAL GLOVE, POLYISOPRENE AND NEOPRENE BLEND: Brand: GAMMEX

## (undated) DEVICE — DRAPE,UNDRBUT,WHT GRAD PCH,CAPPORT,20/CS: Brand: MEDLINE

## (undated) DEVICE — GAMMEX® NON-LATEX PI ORTHO SIZE 6, STERILE POLYISOPRENE POWDER-FREE SURGICAL GLOVE: Brand: GAMMEX

## (undated) DEVICE — DRAPE SRG 131X112X63IN GYN URO

## (undated) DEVICE — DERMABOND LIQUID ADHESIVE

## (undated) NOTE — LETTER
[address], [address]  [facility]: [phone]   Consent to Procedure/Sedation    Date: __10/3/2019_____    Time: ___8:18 AM ___    1.  I authorize the performance upon [facility] the fo Signature of person authorized to consent for patient: Relationship to patient:  ___________________________    ___________________    Witness: Nelia Willingham RN____________________  Date: ____10/3/19 9 am__________    Printed: 10/3/2019   8:18 JUAN CARLOS Barnett

## (undated) NOTE — ED AVS SNAPSHOT
Aleksandar Gage   MRN: [id number]    Department:  [facility] Emergency Department   Date of Visit:  1/27/2019           Disclosure     Insurance plans vary and the physician(s) referred by the ER may not be covered by your plan.  Please contac CARE PHYSICIAN AT ONCE OR RETURN IMMEDIATELY TO THE EMERGENCY DEPARTMENT. If you have been prescribed any medication(s), please fill your prescription right away and begin taking the medication(s) as directed.   If you believe that any of the medications

## (undated) NOTE — LETTER
Patient Name: Lenny Quigley  YOB: 1960          MRN number:  [id number]  Date:  11/14/2018  Referring Physician: Dr. Toni Rogers EVALUATION:    Referring Physician: Dr. Bahman Damon  Diagnosis: Female stress incontinence (N39.3);  Earmatiasteen Hunting demonstrates poor contraction of pelvic floor with decreased strength and endurance, poor hydration and toileting habits, and tenderness to palpation in layers 1,2,3 of pelvic floor.  The patient would benefit from the skilled intervention of a physical the X___________________________________________________ Date____________________    Certification From: 94/02/8032  To: 1/22/19

## (undated) NOTE — LETTER
Date & Time: 1/27/2019, 9:42 PM  Patient: Mehdi Maria  Encounter Provider(s):    Ltei Steiner MD       To Whom It May Concern:    Mehdi Maria was seen and treated in our department on 1/27/2019.  She should not return to work until 1/31/

## (undated) NOTE — Clinical Note
[address] saw Florin today with mixed UI. I've recommended bladder testing. I will work to manage her sx. I appreciate the opportunity to participate in her care.  Thanks, Litzy Kirk

## (undated) NOTE — LETTER
[address]          [address]  :  1960      To Whom It May Concern: This patient was seen in our office on 20 . Work Duty Status:   Work Related: Yes  MMI:No  Work Status: Full Duty for neck condition.  She is off work independent

## (undated) NOTE — LETTER
[address]   Date:   1/29/2020     Name:   Jay Jay Lebron    YOB: 1960   MRN:   [id number]       WHERE IS YOUR PAIN NOW? Maximo the areas on your body where you feel the described sensations.   Use the appropriate s